# Patient Record
Sex: FEMALE | Race: WHITE | NOT HISPANIC OR LATINO | Employment: UNEMPLOYED | URBAN - METROPOLITAN AREA
[De-identification: names, ages, dates, MRNs, and addresses within clinical notes are randomized per-mention and may not be internally consistent; named-entity substitution may affect disease eponyms.]

---

## 2022-06-15 ENCOUNTER — RESEARCH ENCOUNTER (OUTPATIENT)
Dept: RESEARCH | Facility: HOSPITAL | Age: 57
End: 2022-06-15
Payer: COMMERCIAL

## 2022-06-15 NOTE — PROGRESS NOTES
The Patient, Reta Goel, was called today regarding the patient's participation in (IRB #2015.101 PI: Kitty).   The Verbal Informed Consent was read and discussed by the consenter. The following was discussed:   Types of specimens to be collected   All medical information released to researchers will be stripped of identifiers and no patient information will be given to anyone outside of this research project.    Participating in a research study is not the same as getting regular medical care and will not improve the patient's health. The purpose of a research study is to gather information.  Being in this study does not interfere with your regular medical care.   The patient does not have to participate in this study. If they do not join, their care at [facility] will not be affected.  The person granting permission was provided adequate time to ask questions regarding the scope and purpose of the study.  Permission was obtained by telephone.   The above statements were read by the person obtaining permission to the person granting permission and witnessed by Pham Hampton, who also confirmed the patient's consent to the study. The witness information was documented on the verbal consent form as well.  This Verbal Informed Consent process was conducted prior to initiation of any study procedures.

## 2022-06-16 ENCOUNTER — HOSPITAL ENCOUNTER (OUTPATIENT)
Dept: RADIOLOGY | Facility: HOSPITAL | Age: 57
Discharge: HOME | End: 2022-06-16
Payer: COMMERCIAL

## 2022-06-16 ENCOUNTER — RESEARCH ENCOUNTER (OUTPATIENT)
Dept: RESEARCH | Facility: HOSPITAL | Age: 57
End: 2022-06-16
Payer: COMMERCIAL

## 2022-06-16 VITALS — WEIGHT: 135 LBS | HEIGHT: 66 IN | BODY MASS INDEX: 21.69 KG/M2

## 2022-06-16 DIAGNOSIS — Z12.31 BREAST CANCER SCREENING BY MAMMOGRAM: ICD-10-CM

## 2022-06-16 PROCEDURE — 77063 MAMMO DIGITAL SCREENING BILAT WITH TOMO: ICD-10-PCS | Mod: 26,,, | Performed by: RADIOLOGY

## 2022-06-16 PROCEDURE — 77067 SCR MAMMO BI INCL CAD: CPT | Mod: 26,,, | Performed by: RADIOLOGY

## 2022-06-16 PROCEDURE — 77063 BREAST TOMOSYNTHESIS BI: CPT | Mod: TC

## 2022-06-16 PROCEDURE — 77063 BREAST TOMOSYNTHESIS BI: CPT | Mod: 26,,, | Performed by: RADIOLOGY

## 2022-06-16 PROCEDURE — 77067 MAMMO DIGITAL SCREENING BILAT WITH TOMO: ICD-10-PCS | Mod: 26,,, | Performed by: RADIOLOGY

## 2022-06-16 PROCEDURE — 77067 SCR MAMMO BI INCL CAD: CPT | Mod: TC

## 2022-07-06 ENCOUNTER — TELEPHONE (OUTPATIENT)
Dept: RADIOLOGY | Facility: HOSPITAL | Age: 57
End: 2022-07-06
Payer: COMMERCIAL

## 2022-07-06 NOTE — TELEPHONE ENCOUNTER
Called patient in reference to faxing her screening mammogram results to Dr Dougherty.  Verified the physician and fax number that the patient wants her screening mammogram faxed.

## 2022-07-06 NOTE — TELEPHONE ENCOUNTER
----- Message from Yvonne Boyle RN sent at 7/6/2022  3:48 PM CDT -----  Contact: @[phone]    ----- Message -----  From: Delaney Paige  Sent: 7/6/2022   3:18 PM CDT  To: Neftali Navarro Breast Nurse    Pt is calling to have her mammo 6/16  faxed over  fax [phone]. Please call to discuss further.

## 2025-06-17 RX ORDER — SPIRONOLACTONE 25 MG/1
25 TABLET ORAL DAILY
COMMUNITY

## 2025-06-17 RX ORDER — ESTRADIOL 1 MG/1
1 TABLET ORAL DAILY
COMMUNITY

## 2025-06-17 RX ORDER — PROGESTERONE 200 MG/1
200 CAPSULE ORAL DAILY
COMMUNITY

## 2025-06-23 ENCOUNTER — ANESTHESIA (OUTPATIENT)
Facility: HOSPITAL | Age: 60
End: 2025-06-23

## 2025-06-23 ENCOUNTER — HOSPITAL ENCOUNTER (OUTPATIENT)
Facility: HOSPITAL | Age: 60
Discharge: HOME | End: 2025-06-23
Attending: OTOLARYNGOLOGY | Admitting: OTOLARYNGOLOGY

## 2025-06-23 ENCOUNTER — ANESTHESIA EVENT (OUTPATIENT)
Facility: HOSPITAL | Age: 60
End: 2025-06-23

## 2025-06-23 VITALS
BODY MASS INDEX: 21.66 KG/M2 | WEIGHT: 130 LBS | SYSTOLIC BLOOD PRESSURE: 121 MMHG | HEIGHT: 65 IN | OXYGEN SATURATION: 98 % | DIASTOLIC BLOOD PRESSURE: 74 MMHG | TEMPERATURE: 98 F | HEART RATE: 68 BPM

## 2025-06-23 DIAGNOSIS — Z41.1 ENCOUNTER FOR COSMETIC SURGERY: ICD-10-CM

## 2025-06-23 DIAGNOSIS — Z01.818 PRE-OP EVALUATION: ICD-10-CM

## 2025-06-23 PROCEDURE — 63600175 PHARM REV CODE 636 W HCPCS: Performed by: NURSE ANESTHETIST, CERTIFIED REGISTERED

## 2025-06-23 PROCEDURE — C1713 ANCHOR/SCREW BN/BN,TIS/BN: HCPCS | Performed by: OTOLARYNGOLOGY

## 2025-06-23 PROCEDURE — 25000003 PHARM REV CODE 250: Performed by: NURSE ANESTHETIST, CERTIFIED REGISTERED

## 2025-06-23 PROCEDURE — 71000015 HC POSTOP RECOV 1ST HR: Performed by: OTOLARYNGOLOGY

## 2025-06-23 PROCEDURE — 25000003 PHARM REV CODE 250

## 2025-06-23 PROCEDURE — 63600175 PHARM REV CODE 636 W HCPCS: Performed by: ANESTHESIOLOGY

## 2025-06-23 PROCEDURE — 37000009 HC ANESTHESIA EA ADD 15 MINS: Performed by: OTOLARYNGOLOGY

## 2025-06-23 PROCEDURE — 71000016 HC POSTOP RECOV ADDL HR: Performed by: OTOLARYNGOLOGY

## 2025-06-23 PROCEDURE — 37000008 HC ANESTHESIA 1ST 15 MINUTES: Performed by: OTOLARYNGOLOGY

## 2025-06-23 PROCEDURE — 25000003 PHARM REV CODE 250: Performed by: OTOLARYNGOLOGY

## 2025-06-23 PROCEDURE — 36000707: Performed by: OTOLARYNGOLOGY

## 2025-06-23 PROCEDURE — 36000706: Performed by: OTOLARYNGOLOGY

## 2025-06-23 PROCEDURE — 63600175 PHARM REV CODE 636 W HCPCS: Performed by: OTOLARYNGOLOGY

## 2025-06-23 PROCEDURE — 27201423 OPTIME MED/SURG SUP & DEVICES STERILE SUPPLY: Performed by: OTOLARYNGOLOGY

## 2025-06-23 PROCEDURE — 71000033 HC RECOVERY, INTIAL HOUR: Performed by: OTOLARYNGOLOGY

## 2025-06-23 DEVICE — ANCHOR ULTRATINE FOREHEAD 3.5: Type: IMPLANTABLE DEVICE | Site: FOREHEAD | Status: FUNCTIONAL

## 2025-06-23 RX ORDER — TRANEXAMIC ACID 1 G/10ML
INJECTION, SOLUTION INTRAVENOUS
Status: DISCONTINUED | OUTPATIENT
Start: 2025-06-23 | End: 2025-06-23 | Stop reason: HOSPADM

## 2025-06-23 RX ORDER — TETRACAINE HYDROCHLORIDE 5 MG/ML
SOLUTION OPHTHALMIC
Status: DISCONTINUED
Start: 2025-06-23 | End: 2025-06-23 | Stop reason: WASHOUT

## 2025-06-23 RX ORDER — HYDRALAZINE HYDROCHLORIDE 20 MG/ML
10 INJECTION INTRAMUSCULAR; INTRAVENOUS EVERY 6 HOURS PRN
Status: DISCONTINUED | OUTPATIENT
Start: 2025-06-23 | End: 2025-06-23 | Stop reason: HOSPADM

## 2025-06-23 RX ORDER — PETROLATUM,WHITE
OINTMENT IN PACKET (GRAM) TOPICAL
Status: DISCONTINUED
Start: 2025-06-23 | End: 2025-06-23 | Stop reason: HOSPADM

## 2025-06-23 RX ORDER — HYDROXYZINE 50 MG/ML
50 INJECTION, SOLUTION INTRAMUSCULAR EVERY 4 HOURS PRN
Status: DISCONTINUED | OUTPATIENT
Start: 2025-06-23 | End: 2025-06-23 | Stop reason: HOSPADM

## 2025-06-23 RX ORDER — SCOPOLAMINE 1 MG/3D
1 PATCH, EXTENDED RELEASE TRANSDERMAL
Status: DISCONTINUED | OUTPATIENT
Start: 2025-06-23 | End: 2025-06-23 | Stop reason: HOSPADM

## 2025-06-23 RX ORDER — HYDROXYZINE 50 MG/ML
25 INJECTION, SOLUTION INTRAMUSCULAR EVERY 4 HOURS PRN
Status: DISCONTINUED | OUTPATIENT
Start: 2025-06-23 | End: 2025-06-23 | Stop reason: SDUPTHER

## 2025-06-23 RX ORDER — FENTANYL CITRATE 50 UG/ML
INJECTION, SOLUTION INTRAMUSCULAR; INTRAVENOUS
Status: DISCONTINUED | OUTPATIENT
Start: 2025-06-23 | End: 2025-06-23

## 2025-06-23 RX ORDER — TRANEXAMIC ACID 1 G/10ML
INJECTION, SOLUTION INTRAVENOUS
Status: DISCONTINUED
Start: 2025-06-23 | End: 2025-06-23 | Stop reason: HOSPADM

## 2025-06-23 RX ORDER — SULFACETAMIDE SODIUM AND PREDNISOLONE SODIUM PHOSPHATE 100; 2.3 MG/ML; MG/ML
SOLUTION/ DROPS OPHTHALMIC
Status: DISCONTINUED | OUTPATIENT
Start: 2025-06-23 | End: 2025-06-23 | Stop reason: HOSPADM

## 2025-06-23 RX ORDER — HYDROMORPHONE HYDROCHLORIDE 2 MG/ML
0.2 INJECTION, SOLUTION INTRAMUSCULAR; INTRAVENOUS; SUBCUTANEOUS EVERY 5 MIN PRN
Status: DISCONTINUED | OUTPATIENT
Start: 2025-06-23 | End: 2025-06-23 | Stop reason: HOSPADM

## 2025-06-23 RX ORDER — SODIUM CITRATE AND CITRIC ACID MONOHYDRATE 334; 500 MG/5ML; MG/5ML
30 SOLUTION ORAL
Status: DISCONTINUED | OUTPATIENT
Start: 2025-06-23 | End: 2025-06-23 | Stop reason: HOSPADM

## 2025-06-23 RX ORDER — CLINDAMYCIN PHOSPHATE 600 MG/50ML
600 INJECTION, SOLUTION INTRAVENOUS ONCE
Status: DISCONTINUED | OUTPATIENT
Start: 2025-06-23 | End: 2025-06-23 | Stop reason: HOSPADM

## 2025-06-23 RX ORDER — SODIUM CHLORIDE, SODIUM LACTATE, POTASSIUM CHLORIDE, CALCIUM CHLORIDE 600; 310; 30; 20 MG/100ML; MG/100ML; MG/100ML; MG/100ML
INJECTION, SOLUTION INTRAVENOUS CONTINUOUS
Status: DISCONTINUED | OUTPATIENT
Start: 2025-06-23 | End: 2025-06-23 | Stop reason: HOSPADM

## 2025-06-23 RX ORDER — ONDANSETRON HYDROCHLORIDE 2 MG/ML
INJECTION, SOLUTION INTRAMUSCULAR; INTRAVENOUS
Status: DISCONTINUED | OUTPATIENT
Start: 2025-06-23 | End: 2025-06-23

## 2025-06-23 RX ORDER — LIDOCAINE HYDROCHLORIDE AND EPINEPHRINE 10; 10 UG/ML; MG/ML
INJECTION, SOLUTION INFILTRATION; PERINEURAL
Status: DISCONTINUED | OUTPATIENT
Start: 2025-06-23 | End: 2025-06-23 | Stop reason: HOSPADM

## 2025-06-23 RX ORDER — ONDANSETRON HYDROCHLORIDE 2 MG/ML
4 INJECTION, SOLUTION INTRAVENOUS EVERY 4 HOURS PRN
Status: DISCONTINUED | OUTPATIENT
Start: 2025-06-23 | End: 2025-06-23 | Stop reason: HOSPADM

## 2025-06-23 RX ORDER — LABETALOL HCL 20 MG/4 ML
10 SYRINGE (ML) INTRAVENOUS ONCE AS NEEDED
Status: DISCONTINUED | OUTPATIENT
Start: 2025-06-23 | End: 2025-06-23 | Stop reason: HOSPADM

## 2025-06-23 RX ORDER — PROPOFOL 10 MG/ML
VIAL (ML) INTRAVENOUS
Status: DISCONTINUED | OUTPATIENT
Start: 2025-06-23 | End: 2025-06-23

## 2025-06-23 RX ORDER — LIDOCAINE HYDROCHLORIDE 10 MG/ML
INJECTION, SOLUTION EPIDURAL; INFILTRATION; INTRACAUDAL; PERINEURAL
Status: DISCONTINUED | OUTPATIENT
Start: 2025-06-23 | End: 2025-06-23

## 2025-06-23 RX ORDER — ACETAMINOPHEN 10 MG/ML
INJECTION, SOLUTION INTRAVENOUS
Status: DISCONTINUED
Start: 2025-06-23 | End: 2025-06-23 | Stop reason: HOSPADM

## 2025-06-23 RX ORDER — TRANEXAMIC ACID 1 G/10ML
INJECTION, SOLUTION INTRAVENOUS
Status: DISCONTINUED | OUTPATIENT
Start: 2025-06-23 | End: 2025-06-23

## 2025-06-23 RX ORDER — LIDOCAINE HYDROCHLORIDE 10 MG/ML
1 INJECTION, SOLUTION EPIDURAL; INFILTRATION; INTRACAUDAL; PERINEURAL ONCE
Status: DISCONTINUED | OUTPATIENT
Start: 2025-06-23 | End: 2025-06-23 | Stop reason: HOSPADM

## 2025-06-23 RX ORDER — ROCURONIUM BROMIDE 10 MG/ML
INJECTION, SOLUTION INTRAVENOUS
Status: DISCONTINUED | OUTPATIENT
Start: 2025-06-23 | End: 2025-06-23

## 2025-06-23 RX ORDER — ONDANSETRON 4 MG/1
4 TABLET, ORALLY DISINTEGRATING ORAL EVERY 6 HOURS PRN
Status: DISCONTINUED | OUTPATIENT
Start: 2025-06-23 | End: 2025-06-23 | Stop reason: SDUPTHER

## 2025-06-23 RX ORDER — NALOXONE HCL 0.4 MG/ML
VIAL (ML) INJECTION
Status: DISCONTINUED
Start: 2025-06-23 | End: 2025-06-23 | Stop reason: HOSPADM

## 2025-06-23 RX ORDER — CLINDAMYCIN PHOSPHATE 150 MG/ML
INJECTION, SOLUTION INTRAVENOUS
Status: DISCONTINUED
Start: 2025-06-23 | End: 2025-06-23 | Stop reason: HOSPADM

## 2025-06-23 RX ORDER — MIDAZOLAM HYDROCHLORIDE 2 MG/2ML
2 INJECTION, SOLUTION INTRAMUSCULAR; INTRAVENOUS ONCE AS NEEDED
Status: COMPLETED | OUTPATIENT
Start: 2025-06-23 | End: 2025-06-23

## 2025-06-23 RX ORDER — LABETALOL HYDROCHLORIDE 5 MG/ML
5 INJECTION, SOLUTION INTRAVENOUS ONCE AS NEEDED
Status: DISCONTINUED | OUTPATIENT
Start: 2025-06-23 | End: 2025-06-23 | Stop reason: HOSPADM

## 2025-06-23 RX ORDER — ERYTHROMYCIN 5 MG/G
OINTMENT OPHTHALMIC
Status: DISCONTINUED
Start: 2025-06-23 | End: 2025-06-23 | Stop reason: HOSPADM

## 2025-06-23 RX ORDER — CEFAZOLIN 2 G/1
2 INJECTION, POWDER, FOR SOLUTION INTRAMUSCULAR; INTRAVENOUS ONCE
Status: DISCONTINUED | OUTPATIENT
Start: 2025-06-23 | End: 2025-06-23

## 2025-06-23 RX ORDER — SULFACETAMIDE SODIUM AND PREDNISOLONE SODIUM PHOSPHATE 100; 2.3 MG/ML; MG/ML
SOLUTION/ DROPS OPHTHALMIC
Status: DISCONTINUED
Start: 2025-06-23 | End: 2025-06-23 | Stop reason: HOSPADM

## 2025-06-23 RX ORDER — MEPERIDINE HYDROCHLORIDE 25 MG/ML
12.5 INJECTION INTRAMUSCULAR; INTRAVENOUS; SUBCUTANEOUS ONCE AS NEEDED
Status: DISCONTINUED | OUTPATIENT
Start: 2025-06-23 | End: 2025-06-23 | Stop reason: HOSPADM

## 2025-06-23 RX ORDER — HYDROCODONE BITARTRATE AND ACETAMINOPHEN 7.5; 325 MG/1; MG/1
1 TABLET ORAL EVERY 4 HOURS PRN
Refills: 0 | Status: DISCONTINUED | OUTPATIENT
Start: 2025-06-23 | End: 2025-06-23 | Stop reason: HOSPADM

## 2025-06-23 RX ORDER — ACETAMINOPHEN 10 MG/ML
1000 INJECTION, SOLUTION INTRAVENOUS ONCE
Status: COMPLETED | OUTPATIENT
Start: 2025-06-23 | End: 2025-06-23

## 2025-06-23 RX ORDER — MORPHINE SULFATE 4 MG/ML
2 INJECTION, SOLUTION INTRAMUSCULAR; INTRAVENOUS EVERY 10 MIN PRN
Refills: 0 | Status: DISCONTINUED | OUTPATIENT
Start: 2025-06-23 | End: 2025-06-23 | Stop reason: HOSPADM

## 2025-06-23 RX ORDER — SCOPOLAMINE 1 MG/3D
PATCH, EXTENDED RELEASE TRANSDERMAL
Status: COMPLETED
Start: 2025-06-23 | End: 2025-06-23

## 2025-06-23 RX ORDER — ERYTHROMYCIN 5 MG/G
OINTMENT OPHTHALMIC
Status: DISCONTINUED | OUTPATIENT
Start: 2025-06-23 | End: 2025-06-23 | Stop reason: HOSPADM

## 2025-06-23 RX ORDER — CLINDAMYCIN PHOSPHATE 150 MG/ML
INJECTION, SOLUTION INTRAVENOUS
Status: DISCONTINUED | OUTPATIENT
Start: 2025-06-23 | End: 2025-06-23 | Stop reason: HOSPADM

## 2025-06-23 RX ORDER — MIDAZOLAM HYDROCHLORIDE 2 MG/2ML
INJECTION, SOLUTION INTRAMUSCULAR; INTRAVENOUS
Status: DISCONTINUED
Start: 2025-06-23 | End: 2025-06-23 | Stop reason: HOSPADM

## 2025-06-23 RX ORDER — INSULIN ASPART 100 [IU]/ML
0-5 INJECTION, SOLUTION INTRAVENOUS; SUBCUTANEOUS EVERY 4 HOURS PRN
Status: DISCONTINUED | OUTPATIENT
Start: 2025-06-23 | End: 2025-06-23 | Stop reason: HOSPADM

## 2025-06-23 RX ORDER — DEXAMETHASONE SODIUM PHOSPHATE 4 MG/ML
INJECTION, SOLUTION INTRA-ARTICULAR; INTRALESIONAL; INTRAMUSCULAR; INTRAVENOUS; SOFT TISSUE
Status: DISCONTINUED | OUTPATIENT
Start: 2025-06-23 | End: 2025-06-23

## 2025-06-23 RX ORDER — LIDOCAINE HYDROCHLORIDE AND EPINEPHRINE 10; 10 UG/ML; MG/ML
INJECTION, SOLUTION INFILTRATION; PERINEURAL
Status: DISCONTINUED
Start: 2025-06-23 | End: 2025-06-23 | Stop reason: HOSPADM

## 2025-06-23 RX ORDER — FLUMAZENIL 0.1 MG/ML
INJECTION INTRAVENOUS
Status: DISCONTINUED
Start: 2025-06-23 | End: 2025-06-23 | Stop reason: HOSPADM

## 2025-06-23 RX ORDER — CEFAZOLIN 2 G/1
INJECTION, POWDER, FOR SOLUTION INTRAMUSCULAR; INTRAVENOUS
Status: DISCONTINUED
Start: 2025-06-23 | End: 2025-06-23 | Stop reason: WASHOUT

## 2025-06-23 RX ADMIN — PROPOFOL 150 MG: 10 INJECTION, EMULSION INTRAVENOUS at 07:06

## 2025-06-23 RX ADMIN — TRANEXAMIC ACID 600 MG: 100 INJECTION, SOLUTION INTRAVENOUS at 08:06

## 2025-06-23 RX ADMIN — MIDAZOLAM HYDROCHLORIDE 2 MG: 1 INJECTION, SOLUTION INTRAMUSCULAR; INTRAVENOUS at 07:06

## 2025-06-23 RX ADMIN — ONDANSETRON HYDROCHLORIDE 4 MG: 2 SOLUTION INTRAMUSCULAR; INTRAVENOUS at 09:06

## 2025-06-23 RX ADMIN — MINERAL OIL AND WHITE PETROLATUM 1 G: 150; 830 OINTMENT OPHTHALMIC at 09:06

## 2025-06-23 RX ADMIN — ROCURONIUM BROMIDE 50 MG: 10 INJECTION, SOLUTION INTRAVENOUS at 07:06

## 2025-06-23 RX ADMIN — FENTANYL CITRATE 100 MCG: 50 INJECTION, SOLUTION INTRAMUSCULAR; INTRAVENOUS at 07:06

## 2025-06-23 RX ADMIN — DEXMEDETOMIDINE HYDROCHLORIDE 5 MCG: 400 INJECTION INTRAVENOUS at 09:06

## 2025-06-23 RX ADMIN — ROCURONIUM BROMIDE 10 MG: 10 INJECTION, SOLUTION INTRAVENOUS at 08:06

## 2025-06-23 RX ADMIN — LIDOCAINE HYDROCHLORIDE 20 MG: 10 INJECTION, SOLUTION EPIDURAL; INFILTRATION; INTRACAUDAL; PERINEURAL at 07:06

## 2025-06-23 RX ADMIN — SCOPOLAMINE 1 PATCH: 1 PATCH TRANSDERMAL at 06:06

## 2025-06-23 RX ADMIN — DEXAMETHASONE SODIUM PHOSPHATE 12 MG: 4 INJECTION, SOLUTION INTRA-ARTICULAR; INTRALESIONAL; INTRAMUSCULAR; INTRAVENOUS; SOFT TISSUE at 08:06

## 2025-06-23 RX ADMIN — ACETAMINOPHEN 1000 MG: 10 INJECTION, SOLUTION INTRAVENOUS at 10:06

## 2025-06-23 RX ADMIN — DEXMEDETOMIDINE HYDROCHLORIDE 5 MCG: 400 INJECTION INTRAVENOUS at 07:06

## 2025-06-23 RX ADMIN — SUGAMMADEX 150 MG: 100 INJECTION, SOLUTION INTRAVENOUS at 09:06

## 2025-06-23 RX ADMIN — SODIUM CHLORIDE, POTASSIUM CHLORIDE, SODIUM LACTATE AND CALCIUM CHLORIDE: 600; 310; 30; 20 INJECTION, SOLUTION INTRAVENOUS at 07:06

## 2025-06-23 NOTE — DISCHARGE INSTRUCTIONS
MEDICATIONS    ?First dose of Hydrocodone/Norco (narcotic/pain medication), if needed may taken next at ___3:00 pm_____. Take with food to prevent nausea.   ?First dose of OTC Tylenol, if needed for discomfort, may be taken at ____3:00 pm_____.  ?Both drugs contain Tylenol.  ?No Ibuprofen, Advil, Aleve Aspirin or NSAIDS.    ?First dose of Ondansetron/Zofran (anti-emetic), if needed for nausea, may be taken next at ______2:00 pm_______.    ?Take first dose of Arnica dissolving tablet tonight. This is for bruising/swelling prevention.  Resume all other surgery vitamins the day after surgery.    ? Sulfacetamide Eye drops (antibiotic) to be given twice a day for 3 days, 2 drops in each eye. Bring to office for follow up.    ?Resume Z-Pack (antibiotic) the morning after surgery.    ? Next dose of Acyclovir (antiviral) at _____tomorrow______.    ?May take next dose of Diazepam/Valium (anxiolytic) tonight at for relaxation.    ?May take Ambien (insomnia relief) at tonight if unable to sleep.  You must wait 4 hours following a Valium dose to reduce the risk of oversedation.    ? May use artificial tears for dry, itchy, burning eyes as needed.       ?Iced gauze to eyes for 48 hours.    ?Bring bag of ointments/drops to post op appointment.     ?Do not cut/brush/remove the 4 black strings running from your forehead to your eyes.  These are SUTURES.    Follow up scheduled for _________6/24/25 at 7:30 am________    For questions or concerns please call Dr. Ha office at [phone].         EndoBrow and Blepharoplasty, Care After      Refer to this sheet in the next few weeks. These discharge instructions provide you with general information on caring for yourself after you leave the hospital. Your caregiver may also give you specific instructions. Your treatment has been planned according to the most current medical practices available, but unavoidable complications occasionally occur. If you have any problems or  questions after discharge, call your caregiver.    HOME CARE INSTRUCTIONS:    Resume home meds unless otherwise discussed with doctor    No work/school until cleared by doctor    Get plenty of rest and do not get overheated    Diet as tolerated    Elevated head of bed (Recliner, Wedge or multiple pillows)    No heavy lifting, no bending over for at least two weeks after surgery unless cleared by doctor. No driving for one week following surgery unless cleared by doctor    ENDOBROW POST OP INSTRUCTIONS:    Once instructed by your surgeon to begin, you may use a q-tip and peroxide to clean head incision lines three times per day. Apply Bactroban/Polysporin ointment to incisions after cleaning    Avoid any hair treatment (color/perm) for at least 1-2 weeks before and 6-8 weeks after surgery    The office will shampoo your hair the first time for you. After initial wash use baby shampoo at home for 2 weeks when washing hair    Avoid sun and tanning beds    When turning your head, move the head and shoulders deliberately as a single unit    BLEPH POST OP INSTRUCTIONS:    Iced gauze to eyes for at least 48 hours after procedure    No wearing contact lenses for 2 weeks after surgery    No wearing mascara, eyeliner or eye shadow for 10-14 days after surgery    If an incision was made on the outside of the eyelid, use a q-tip and apply erythromycin opthalmic ointment to eyelid incisions three times per day    After 48 hours you may get the eye sutures wet, but be careful to keep the force of the water from beating directly on the incision when showering    WHAT TO EXPECT AFTER SURGERY:    Facial and neck swelling and bruising  Incisions that are raised and/or red  Mild pain or discomfort  Redness to the whites of the eyeball; this is only a form of bruising  Slight itching and tightness of eyelids  If any excessive bleeding persists, apply light pressure and hold for 15 minutes. If bleeding does not stop, call physician's  office  If any changes in vision occur, call physician's office    SEEK MEDICAL CARE IF:    You develop bleeding.  You develop redness, swelling, or increasing pain in your eyelids.  You develop facial pain.  You have pus coming from the wound.    **Call the office immediately to report any vision changes, unexplained development of pain, facial swelling, fever or any other questions/concerns - [phone] **    SEEK IMMEDIATE MEDICAL CARE IF:    You have a fever.    You develop a rash.    You have difficulty breathing.    You develop any reaction or side effects to medicines given.

## 2025-06-23 NOTE — OP NOTE
[facility]                         [address] [address]    PATIENT NAME:      JOSÉ MARLOW   YOB: 1965  CSN:               [id number]  MRN:               [id number]  ADMIT DATE:        06/23/2025 06:17:00  PHYSICIAN:         Toribio Kearns                          OPERATIVE REPORT      DATE OF SURGERY:    06/23/2025    SURGEON:  Toribio Kearns    PREOPERATIVE DIAGNOSES:  Brow ptosis, dermatochalasis, perioral rhytides.    POSTOPERATIVE DIAGNOSES:  Brow ptosis, dermatochalasis, perioral rhytides.    PROCEDURES:  Endoscopic brow lift, upper lid blepharoplasty, transconjunctival   lower lid blepharoplasty with skin pinch, and perioral CO2 fractionated laser   resurfacing.    ASSISTANT:  Nivia Lopez RN.    PROCEDURE IN DETAIL:  The patient was brought to the operating room and placed   in supine position.  After achieving general endotracheal anesthesia, a   combination of 1% lidocaine with 1:100,000 epinephrine was injected into the   soft tissue marked for undermining.  The face was prepped and draped for   procedure.    The patient was brought to the operating room, placed in supine position.  After   achieving general endotracheal anesthesia, 1% lidocaine with 1:100,000   epinephrine injected into the premarked incisions of the forehead and along the   glabellar and suprabrow regions.  The patient was then prepped and draped for   surgery.      Addressing the brow, a central incision was made in the hairline while two   pretrichial incisions were made bilaterally from the midpupillary line into the   temporal scalp, following the vellus hairs of the hairline.  These incisions   were taken down through the skin and subcutaneous tissues, and subcutaneous   flaps were elevated approximately 4 cm, with care being taken to leave the   frontalis muscle intact.  Once this  dissection was done, an incision was made   horizontally into the periosteum where a subperiosteal dissection was done down   to 2 cm above the orbital rim.  This was also done through the central incision.        Then, with the use of the 30-degree endoscope, the arcus marginalis was   dissected with care being taken to identify the neurovascular bundles.  The    muscles and procerus were identified and released.  Once this was   completed, we turned our attention to the temporal incisions, where incisions   were made in the temporal scalp down to the superficial layer of the deep   temporal fascia.  This was then dissected to the lateral orbital rim, where the   periosteum was then incised which connected the temporal cavity to the anterior   cavity from inferior to superior and lateral to medial.      Once this was completed bilaterally, and hemostasis was achieved with bipolar   cautery, a 2-0 PDS suture was then used to sew the skin flap to the superficial   layer of the deep temporal fascia.  These incisions were closed with clips.  We   then turned our attention back to the pretrichial incisions, where the skin was   retracted superiorly, the scalp was advanced and redundant skin was removed.    The wound was closed in the hair-bearing portion of the incision with surgical   staples, while the skin was closed with 4-0 plain sutures.  The central incision   was closed with surgical staples.    We turned our attention to the upper lid blepharoplasty where corneal protectors   were placed to protect both eyes.  A predetermined amount of upper eyelid skin   was removed using sharp dissection.  A strip of orbicularis oculi muscle was   taken and hemostasis was achieved using bipolar cautery.  The fat in the central   and nasal compartments were removed using the clamp, cut and cautery technique   and the lids were closed with 6-0 nylon in a running fashion.  This procedure   was done bilaterally.    The  eye was protected with the corneal protector and a Colorado needle was then   used to incise the conjunctiva as well as lower lid retractors.  Upon doing so,   fat in the nasal, central, lateral and temporal compartments were removed using   the clamp, cut and cautery technique with care being taken to remove only that   fat which protruded from the orbital rim.  Once the appropriate amount of fat   was removed, the lid was snapped back into position and the corneal protector   was removed.  BSS was used to rinse the eye thoroughly prior to the completion   of the procedure.    Using -Cj forceps, a pinch of redundant lower lid skin in a subciliary   crease was removed with tenotomy scissors.  The wound was closed with 6-0 plain   gut suture in interrupted fashion.  This was done bilaterally.    After completion of the lower lid blepharoplasty.  Lacri-Lube was generously   applied.  The eyes were protected with lead corneal shields while the patient   was wrapped in wet towels.  The teeth were protected with a moist tongue blade   and after testing, the laser was noted to be functioning properly away from the   patient.  The following laser settings were used.  There were 2 passes along the   deep rhytides of the upper lip at 65% overlap at 20 mJ and 2-millisecond   duration.  Vaseline was generously applied after the perioral laser was   completed.    The patient tolerated the procedure well and was extubated and awakened in the   operating room and brought to the recovery room in stable condition.    DISCHARGE SUMMARY:  Juana is status post brow lift, upper and lower lid   blepharoplasty, and perioral resurfacing and doing well.  We will discharge her   home and follow in the office tomorrow on standard postoperative medications and   instructions.        ______________________________  Toribio GAN/AQS  DD:  06/23/2025  Time:  09:57AM  DT:  06/23/2025  Time:  11:36AM  Job #:   [id number]      OPERATIVE REPORT

## 2025-06-23 NOTE — ANESTHESIA PROCEDURE NOTES
Intubation    Date/Time: 6/23/2025 7:56 AM    Performed by: Indira Sol CRNA  Authorized by: Jesús Greene MD    Intubation:     Induction:  Intravenous    Intubated:  Postinduction    Mask Ventilation:  Easy mask    Attempts:  1    Attempted By:  CRNA    Method of Intubation:  Direct    Blade:  Yolanda 3    Laryngeal View Grade: Grade I - full view of cords      Difficult Airway Encountered?: No      Complications:  None    Airway Device:  Oral endotracheal tube    Airway Device Size:  6.5    Style/Cuff Inflation:  Cuffed (inflated to minimal occlusive pressure)    Inflation Amount (mL):  6    Tube secured:  21    Secured at:  The lips    Placement Verified By:  Capnometry    Complicating Factors:  None    Findings Post-Intubation:  BS equal bilateral

## 2025-06-23 NOTE — ANESTHESIA PREPROCEDURE EVALUATION
"2025    Juana Goel is a 60 y.o., female with active asthma, Ø hospital or er visits due to asthma, has taken breathing tx this am.     Pre-operative evaluation for Procedure(s) (LRB):  RHYTIDECTOMY, FACE    PROCEDURES Endobrow w/Tines; Bilateral Upper and Lower Blephs; CO2 Laser Gabby-Oral ////CO2 Laser will be provided by [facility]; CO2 Laser Required (N/A)  BLEPHAROPLASTY, UPPER EYELID    ////bilateral (Bilateral)  BLEPHAROPLASTY, LOWER EYELID    ////bilateral (Bilateral)  DERMABRASION, FACE (N/A)    Pre-op Assessment    I have reviewed the Patient Summary Reports.     I have reviewed the Nursing Notes. I have reviewed the NPO Status.   I have reviewed the Medications.     Review of Systems  Anesthesia Hx:  No problems with previous Anesthesia                Cardiovascular:  Exercise tolerance: good                                             Pulmonary:    Asthma moderate                       History reviewed. No pertinent past medical history.    Problem List[1]    Review of patient's allergies indicates:   Allergen Reactions    Quinolones Anaphylaxis     Possible reaction, patient unsure but does not want to be given     Amoxicillin-pot clavulanate Diarrhea, Nausea And Vomiting and Rash       Current Outpatient Medications   Medication Instructions    estradioL (ESTRACE) 1 mg, Daily    progesterone (PROMETRIUM) 200 mg, Daily    spironolactone (ALDACTONE) 25 mg, Daily       Past Surgical History:   Procedure Laterality Date    APPENDECTOMY       SECTION      KNEE SURGERY Left        Social History[2]    /81   Pulse 72   Temp 36.4 °C (97.5 °F) (Oral)   Ht 5' 5" (1.651 m)   Wt 59 kg (130 lb)   SpO2 98%   Breastfeeding No   BMI 21.63 kg/m²       Physical Exam  General: Well nourished and Cooperative    Airway:  Mallampati: II   Mouth Opening: Normal  TM Distance: Normal  Tongue: Normal  Neck ROM: Normal ROM    Dental:  Intact    Chest/Lungs:  Clear to auscultation    Heart:  Rhythm: " Regular Rhythm              Anesthesia Plan  Type of Anesthesia, risks & benefits discussed:    Anesthesia Type: Gen ETT  Intra-op Monitoring Plan: Standard ASA Monitors  Post Op Pain Control Plan: multimodal analgesia  Induction:  IV  Informed Consent: Informed consent signed with the Patient and all parties understand the risks and agree with anesthesia plan.  All questions answered.   ASA Score: 2  Day of Surgery Review of History & Physical: H&P Update referred to the surgeon/provider.    Ready For Surgery From Anesthesia Perspective.     .  Anesthesia consent includes material facts, risks, complications & alternatives, and possibility of altering the anesthesia plan due to intraoperative conditions.    I reviewed problem list, prior to admission medication list, appropriate labs, any workup, Xray, EKG etc   See anesthesia chart for details of the anesthesia plan carried out.       Jesús Greene MD    ¤                [1] There is no problem list on file for this patient.  [2]   Social History  Socioeconomic History    Marital status:    Tobacco Use    Smoking status: Never    Smokeless tobacco: Never   Vaping Use    Vaping status: Never Used   Substance and Sexual Activity    Alcohol use: Not Currently    Drug use: Not Currently

## 2025-06-23 NOTE — TRANSFER OF CARE
Anesthesia Transfer of Care Note    Patient: Juana Goel    Procedure(s) Performed: Procedure(s) (LRB):  RHYTIDECTOMY, FACE    /////**ALL CASH PROCEDURES*  Endobrow w/Tines; Bilateral Upper and Lower Blephs; CO2 Laser Gabby-Oral ////CO2 Laser will be provided by [facility]; CO2 Laser Required (N/A)  BLEPHAROPLASTY, UPPER EYELID    ////bilateral (Bilateral)  BLEPHAROPLASTY, LOWER EYELID    ////bilateral (Bilateral)  DERMABRASION, FACE (N/A)    Patient location: PACU    Anesthesia Type: general    Transport from OR: Transported from OR on room air with adequate spontaneous ventilation    Post pain: adequate analgesia    Post assessment: no apparent anesthetic complications and tolerated procedure well    Post vital signs: stable    Level of consciousness: sedated    Nausea/Vomiting: no nausea/vomiting    Complications: none    Transfer of care protocol was followed

## 2025-06-23 NOTE — ANESTHESIA POSTPROCEDURE EVALUATION
Anesthesia Post Evaluation    Patient: Juana Goel    Procedure(s) Performed: Procedure(s) (LRB):  RHYTIDECTOMY, FACE    ALL CASH PROCEDURESEndobrow w/Tines; Bilateral Upper and Lower Blephs; CO2 Laser Gabby-Oral ////CO2 Laser will be provided by [facility]; CO2 Laser Required (N/A)  BLEPHAROPLASTY, UPPER EYELID    ////bilateral (Bilateral)  BLEPHAROPLASTY, LOWER EYELID    ////bilateral (Bilateral)  DERMABRASION, FACE (N/A)    Final Anesthesia Type: general      Patient location during evaluation: floor  Patient participation: Yes- Able to Participate  Level of consciousness: awake and alert  Post-procedure vital signs: reviewed and stable  Pain management: adequate  Airway patency: patent    PONV status at discharge: No PONV  Anesthetic complications: no      Cardiovascular status: blood pressure returned to baseline  Respiratory status: spontaneous ventilation and room air  Hydration status: euvolemic  Follow-up not needed.              Vitals Value Taken Time   /78 06/23/25 13:19   Temp 36.4 °C (97.5 °F) 06/23/25 10:53   Pulse 75 06/23/25 13:22   Resp  06/23/25 17:14   SpO2 98 % 06/23/25 13:22   Vitals shown include unfiled device data.      No case tracking events are documented in the log.      Pain/Bridger Score: Pain Rating Prior to Med Admin: 4 (6/23/2025 10:53 AM)  Bridger Score: 9 (6/23/2025 12:00 PM)

## (undated) DEVICE — GLOVE SENSICARE PI MICRO 7

## (undated) DEVICE — GAUZE CNFRM STRTCH STRL 4X75IN

## (undated) DEVICE — SOL IRRI STRL WATER 1000ML

## (undated) DEVICE — NDL HYPO STD REG BVL 30G 0.5IN

## (undated) DEVICE — SPONGE GAUZE 16PLY 4X4

## (undated) DEVICE — PENCIL ELECSURG ROCKER 15FT

## (undated) DEVICE — SOL NORMAL USPCA 0.9%

## (undated) DEVICE — SUT 5/0 18IN PLAIN FAST AB

## (undated) DEVICE — SPONGE LAP 18X18 PREWASHED

## (undated) DEVICE — SUT ETHILON BL MONO P3

## (undated) DEVICE — SUT SILK 5-0 BLK P-3

## (undated) DEVICE — BASIN EMESIS 700 ML

## (undated) DEVICE — POSITIONER HEAD ADULT

## (undated) DEVICE — DRAPE C-ARM COVER EZ 36X28IN

## (undated) DEVICE — SUT 4-0 MERSILENE P-3

## (undated) DEVICE — KIT ANTIFOG W/SPONG & FLUID

## (undated) DEVICE — COVER PROXIMA MAYO STAND

## (undated) DEVICE — SUT PDS PLUS 4-0 P-3 18IN

## (undated) DEVICE — EXTRACTOR STAPLE SQZ HANDLE

## (undated) DEVICE — Device

## (undated) DEVICE — CLOSURE SKIN STERI STRIP 1/2X4

## (undated) DEVICE — TOWEL OR DISP STRL BLUE 4/PK

## (undated) DEVICE — SYR B-D DISP CONTROL 10CC100/C

## (undated) DEVICE — GLOVE SIGNATURE MICRO LTX 6.5

## (undated) DEVICE — ELECTRODE BLADE INSULATED 1 IN

## (undated) DEVICE — BLADE SURG STAINLESS STEEL #15

## (undated) DEVICE — SYR LUER LOCK 1CC

## (undated) DEVICE — SEALANT VISTASEAL FIBRIN 4ML

## (undated) DEVICE — ELECTRODE PATIENT RETURN DISP

## (undated) DEVICE — SUT ETHILON 5-0 P3 18IN BLK

## (undated) DEVICE — NDL HYPO REG 25G X 1 1/2

## (undated) DEVICE — BANDAGE GAUZE COT STRL 4.5X4.1

## (undated) DEVICE — STAPLER SKIN PROXIMATE WIDE

## (undated) DEVICE — SKINMARKER & RULER REGULAR X-F

## (undated) DEVICE — SUT GUT PL. 4-0 27 FS-2

## (undated) DEVICE — CONTAINER SPECIMEN SCREW 4OZ

## (undated) DEVICE — SOL NACL IRR 1000ML BTL

## (undated) DEVICE — MARKER FN REG DUAL UTIL RULER

## (undated) DEVICE — PROTECTOR CORNEAL CROUCH ST

## (undated) DEVICE — NDL N SERIES MICRO-DISSECTION

## (undated) DEVICE — SUT VICRYL 3-0 27 SH

## (undated) DEVICE — KNIFE OPHTH STR SIDEPORT 15DEG

## (undated) DEVICE — SEE MEDLINE ITEM 152529

## (undated) DEVICE — SUPPORT ULNA NERVE PROTECTOR

## (undated) DEVICE — GLOVE PROTEXIS LTX MICRO  7.5

## (undated) DEVICE — ADHESIVE DERMABOND ADVANCED

## (undated) DEVICE — SUT 5/0 18IN PDS II CLR MO

## (undated) DEVICE — CATH SUCTION W/O STR CONN 10FR

## (undated) DEVICE — SUT PDS PLUS 2-0 SH 27IN

## (undated) DEVICE — SPONGE COTTON TRAY 4X4IN